# Patient Record
Sex: MALE | Race: BLACK OR AFRICAN AMERICAN | Employment: FULL TIME | URBAN - METROPOLITAN AREA
[De-identification: names, ages, dates, MRNs, and addresses within clinical notes are randomized per-mention and may not be internally consistent; named-entity substitution may affect disease eponyms.]

---

## 2020-03-04 PROCEDURE — 99283 EMERGENCY DEPT VISIT LOW MDM: CPT

## 2020-03-05 ENCOUNTER — HOSPITAL ENCOUNTER (EMERGENCY)
Age: 25
Discharge: HOME | End: 2020-03-05
Attending: EMERGENCY MEDICINE
Payer: COMMERCIAL

## 2020-03-05 ENCOUNTER — HOSPITAL ENCOUNTER (OUTPATIENT)
Dept: CT IMAGING | Age: 25
Discharge: HOME | End: 2020-03-05
Attending: EMERGENCY MEDICINE

## 2020-03-05 VITALS
OXYGEN SATURATION: 100 % | SYSTOLIC BLOOD PRESSURE: 124 MMHG | WEIGHT: 129 LBS | HEIGHT: 66 IN | BODY MASS INDEX: 20.73 KG/M2 | RESPIRATION RATE: 16 BRPM | TEMPERATURE: 98 F | DIASTOLIC BLOOD PRESSURE: 78 MMHG | HEART RATE: 84 BPM

## 2020-03-05 DIAGNOSIS — R31.0 GROSS HEMATURIA: Primary | ICD-10-CM

## 2020-03-05 LAB
APPEARANCE UR: ABNORMAL
BACTERIA URNS QL MICRO: 0 /HPF
BILIRUB UR QL: NEGATIVE
CASTS URNS QL MICRO: ABNORMAL /LPF
COLOR UR: YELLOW
EPI CELLS #/AREA URNS HPF: ABNORMAL /HPF
GLUCOSE UR STRIP.AUTO-MCNC: NEGATIVE MG/DL
HGB UR QL STRIP: ABNORMAL
KETONES UR QL STRIP.AUTO: NEGATIVE MG/DL
LEUKOCYTE ESTERASE UR QL STRIP.AUTO: ABNORMAL
NITRITE UR QL STRIP.AUTO: NEGATIVE
PH UR STRIP: 6.5 [PH] (ref 5–9)
PROT UR STRIP-MCNC: ABNORMAL MG/DL
RBC #/AREA URNS HPF: >100 /HPF
SP GR UR REFRACTOMETRY: 1.01 (ref 1–1.02)
UROBILINOGEN UR QL STRIP.AUTO: 1 EU/DL (ref 0.2–1)
WBC URNS QL MICRO: ABNORMAL /HPF

## 2020-03-05 PROCEDURE — 74176 CT ABD & PELVIS W/O CONTRAST: CPT

## 2020-03-05 PROCEDURE — 81001 URINALYSIS AUTO W/SCOPE: CPT

## 2020-03-05 NOTE — ED PROVIDER NOTES
26-year-old male presents with 3-day history of gross hematuria. No falls, trauma, exercise/running/marching. Patient has no abdominal pain no testicular pain. Really no flank pain. There is no dysuria but he states it does \"feel weird\", patient passes clots sometimes and occasionally has to strain to empty his bladder but has not had any defacto bladder outlet obstruction/urinary retention. Past Medical History:   Diagnosis Date    Asthma        Past Surgical History:   Procedure Laterality Date    ABDOMEN SURGERY PROC UNLISTED      umbilical hernia         No family history on file.     Social History     Socioeconomic History    Marital status: SINGLE     Spouse name: Not on file    Number of children: Not on file    Years of education: Not on file    Highest education level: Not on file   Occupational History    Not on file   Social Needs    Financial resource strain: Not on file    Food insecurity:     Worry: Not on file     Inability: Not on file    Transportation needs:     Medical: Not on file     Non-medical: Not on file   Tobacco Use    Smoking status: Never Smoker    Smokeless tobacco: Never Used   Substance and Sexual Activity    Alcohol use: No    Drug use: No    Sexual activity: Not Currently   Lifestyle    Physical activity:     Days per week: Not on file     Minutes per session: Not on file    Stress: Not on file   Relationships    Social connections:     Talks on phone: Not on file     Gets together: Not on file     Attends Mosque service: Not on file     Active member of club or organization: Not on file     Attends meetings of clubs or organizations: Not on file     Relationship status: Not on file    Intimate partner violence:     Fear of current or ex partner: Not on file     Emotionally abused: Not on file     Physically abused: Not on file     Forced sexual activity: Not on file   Other Topics Concern    Not on file   Social History Narrative    Not on file ALLERGIES: Latex    Review of Systems   Constitutional: Negative for chills and fever. HENT: Negative for rhinorrhea and sore throat. Eyes: Negative for discharge and redness. Respiratory: Negative for cough and shortness of breath. Cardiovascular: Negative for chest pain and palpitations. Gastrointestinal: Negative for abdominal pain, diarrhea, nausea and vomiting. Genitourinary: Positive for difficulty urinating and hematuria. Negative for decreased urine volume, discharge, dysuria, flank pain, scrotal swelling, testicular pain and urgency. Musculoskeletal: Negative for arthralgias and back pain. Skin: Negative for rash. Neurological: Negative for dizziness and headaches. Vitals:    03/04/20 2352 03/05/20 0156   BP: 121/77 124/78   Pulse: 78 84   Resp: 16 16   Temp: 97.9 °F (36.6 °C) 98 °F (36.7 °C)   SpO2: 100% 100%   Weight: 58.5 kg (129 lb)    Height: 5' 6\" (1.676 m)             Physical Exam  Vitals signs and nursing note reviewed. Constitutional:       General: He is not in acute distress. Appearance: Normal appearance. He is well-developed. He is not ill-appearing, toxic-appearing or diaphoretic. HENT:      Head: Normocephalic and atraumatic. Eyes:      General:         Right eye: No discharge. Left eye: No discharge. Conjunctiva/sclera: Conjunctivae normal.   Neck:      Musculoskeletal: Normal range of motion and neck supple. Pulmonary:      Effort: Pulmonary effort is normal. No respiratory distress. Abdominal:      General: Abdomen is flat. Tenderness: There is no abdominal tenderness. There is no right CVA tenderness, left CVA tenderness, guarding or rebound. Musculoskeletal: Normal range of motion. Skin:     General: Skin is warm and dry. Findings: No rash. Neurological:      General: No focal deficit present. Mental Status: He is alert and oriented to person, place, and time. Mental status is at baseline.       Motor: No abnormal muscle tone. Comments: cni 2-12 grossly  Nl gait,  Nl speech     Psychiatric:         Mood and Affect: Mood normal.         Behavior: Behavior normal.          MDM  Number of Diagnoses or Management Options  Gross hematuria:   Diagnosis management comments: Medical decision making note:  Gross hematuria. Will culture urine, refrain from antibiotics at this point. CT urogram is negative,  Will refer to urology for close follow-up, indications to return discussed, to include fever, urinary outlet obstruction. This concludes the \"medical decision making note\" part of this emergency department visit note.            Procedures

## 2020-03-05 NOTE — ED TRIAGE NOTES
Patient arrives to ED from home. Patient complains of blood is in urine. Patient states this started a few days ago. Patient states it's a lot of blood and it's bright red. Denies any trauma. Patient denies pain and states, \"it just feels weird\". Patient states it's a lot of clots. Spoke with Rick Atkins MD and orders received.

## 2020-03-05 NOTE — DISCHARGE INSTRUCTIONS
Drink plenty of fluids  Return to the ER if you are unable to urinate  Follow-up with with Dr. Skylar Ellison, call him tomorrow. Patient Education        Blood in the Urine: Care Instructions  Your Care Instructions    Blood in the urine, or hematuria, may make the urine look red, brown, or pink. There may be blood every time you urinate or just from time to time. You cannot always see blood in the urine, but it will show up in a urine test.  Blood in the urine may be serious. It should always be checked by a doctor. Your doctor may recommend more tests, including an X-ray, a CT scan, or a cystoscopy (which lets a doctor look inside the urethra and bladder). Blood in the urine can be a sign of another problem. Common causes are bladder infections and kidney stones. An injury to your groin or your genital area can also cause bleeding in the urinary tract. Very hard exercise--such as running a marathon--can cause blood in the urine. Blood in the urine can also be a sign of kidney disease or cancer in the bladder or kidney. Many cases of blood in the urine are caused by a harmless condition that runs in families. This is called benign familial hematuria. It does not need any treatment. Sometimes your urine may look red or brown even though it does not contain blood. For example, not getting enough fluids (dehydration), taking certain medicines, or having a liver problem can change the color of your urine. Eating foods such as beets, rhubarb, or blackberries or foods with red food coloring can make your urine look red or pink. Follow-up care is a key part of your treatment and safety. Be sure to make and go to all appointments, and call your doctor if you are having problems. It's also a good idea to know your test results and keep a list of the medicines you take. When should you call for help? Call your doctor now or seek immediate medical care if:    · You have symptoms of a urinary infection. For example:  ?  You have pus in your urine. ? You have pain in your back just below your rib cage. This is called flank pain. ? You have a fever, chills, or body aches. ? It hurts to urinate. ? You have groin or belly pain.     · You have more blood in your urine.    Watch closely for changes in your health, and be sure to contact your doctor if:    · You have new urination problems.     · You do not get better as expected. Where can you learn more? Go to http://nam-vijaya.info/. Enter V389 in the search box to learn more about \"Blood in the Urine: Care Instructions. \"  Current as of: December 19, 2018  Content Version: 12.2  © 3155-3573 [employer]. Care instructions adapted under license by [employer] (which disclaims liability or warranty for this information). If you have questions about a medical condition or this instruction, always ask your healthcare professional. [place] [address] any warranty or liability for your use of this information.

## 2021-03-09 ENCOUNTER — HOSPITAL ENCOUNTER (EMERGENCY)
Age: 26
Discharge: HOME | End: 2021-03-09
Attending: STUDENT IN AN ORGANIZED HEALTH CARE EDUCATION/TRAINING PROGRAM

## 2021-03-09 VITALS
SYSTOLIC BLOOD PRESSURE: 124 MMHG | OXYGEN SATURATION: 97 % | DIASTOLIC BLOOD PRESSURE: 71 MMHG | RESPIRATION RATE: 18 BRPM | WEIGHT: 130 LBS | BODY MASS INDEX: 20.89 KG/M2 | HEART RATE: 83 BPM | HEIGHT: 66 IN | TEMPERATURE: 98.2 F

## 2021-03-09 DIAGNOSIS — B00.1 HERPES LABIALIS: Primary | ICD-10-CM

## 2021-03-09 PROCEDURE — 99282 EMERGENCY DEPT VISIT SF MDM: CPT

## 2021-03-09 RX ORDER — ACYCLOVIR 800 MG/1
400 TABLET ORAL
Qty: 35 TAB | Refills: 4 | Status: SHIPPED | OUTPATIENT
Start: 2021-03-09 | End: 2021-03-09 | Stop reason: SDUPTHER

## 2021-03-09 RX ORDER — ACYCLOVIR 800 MG/1
400 TABLET ORAL
Qty: 35 TAB | Refills: 4 | Status: SHIPPED | OUTPATIENT
Start: 2021-03-09 | End: 2021-03-16

## 2021-03-10 ENCOUNTER — HOSPITAL ENCOUNTER (EMERGENCY)
Age: 26
Discharge: HOME | End: 2021-03-10
Attending: EMERGENCY MEDICINE

## 2021-03-10 VITALS
DIASTOLIC BLOOD PRESSURE: 95 MMHG | HEART RATE: 81 BPM | HEIGHT: 66 IN | RESPIRATION RATE: 16 BRPM | OXYGEN SATURATION: 98 % | SYSTOLIC BLOOD PRESSURE: 124 MMHG | TEMPERATURE: 98 F | BODY MASS INDEX: 20.89 KG/M2 | WEIGHT: 130 LBS

## 2021-03-10 DIAGNOSIS — B00.1 COLD SORE: Primary | ICD-10-CM

## 2021-03-10 PROCEDURE — 99282 EMERGENCY DEPT VISIT SF MDM: CPT

## 2021-03-10 NOTE — ED NOTES
I have reviewed discharge instructions with the patient. The patient verbalized understanding. Patient left ED via Discharge Method: ambulatory to Home with \"baby mama\". Opportunity for questions and clarification provided. Patient given 1 scripts. To continue your aftercare when you leave the hospital, you may receive an automated call from our care team to check in on how you are doing. This is a free service and part of our promise to provide the best care and service to meet your aftercare needs.  If you have questions, or wish to unsubscribe from this service please call [phone]. Thank you for Choosing our [facility] Emergency Department.

## 2021-03-10 NOTE — DISCHARGE INSTRUCTIONS
Take the medication prescribed as directed. Arrange follow-up with one of the primary care providers listed. Return for worsening symptoms, concerns or questions.

## 2021-03-10 NOTE — ED TRIAGE NOTES
Arrives with face mask in place. Reports pain and swelling to right side upper lip. Onset earlier today. Hx HSV.  Reports as similar pain

## 2021-03-10 NOTE — Clinical Note
[facility] EMERGENCY DEPT 
ONE  [address] [facility] 
[phone] Work/School Note Date: 3/10/2021 To Whom It May concern: 
 
Dario Rodriguez was seen and treated today in the emergency room by the following provider(s): 
Attending Provider: Anali Henry MD.   
 
Dario Rodriguez is excused from work/school on 03/10/21 and 03/11/21. He is medically clear to return to work/school on 3/12/2021.   
 
 
Sincerely, 
 
 
 
 
Sakshi Robbins MD

## 2021-03-10 NOTE — ED PROVIDER NOTES
50-year-old male patient with a history of recurrent cold sores presents to the emergency department with reports of swelling over the right upper lip that started today. Patient reports symptoms consistent with cold sore. He denies ulcerations elsewhere on his body. He denies any associated fever or chills and reports no difficulty swallowing. Past Medical History:   Diagnosis Date    Asthma        Past Surgical History:   Procedure Laterality Date    ABDOMEN SURGERY PROC UNLISTED      umbilical hernia         No family history on file.     Social History     Socioeconomic History    Marital status: SINGLE     Spouse name: Not on file    Number of children: Not on file    Years of education: Not on file    Highest education level: Not on file   Occupational History    Not on file   Social Needs    Financial resource strain: Not on file    Food insecurity     Worry: Not on file     Inability: Not on file    Transportation needs     Medical: Not on file     Non-medical: Not on file   Tobacco Use    Smoking status: Never Smoker    Smokeless tobacco: Never Used   Substance and Sexual Activity    Alcohol use: No    Drug use: No    Sexual activity: Not Currently   Lifestyle    Physical activity     Days per week: Not on file     Minutes per session: Not on file    Stress: Not on file   Relationships    Social connections     Talks on phone: Not on file     Gets together: Not on file     Attends Lutheran service: Not on file     Active member of club or organization: Not on file     Attends meetings of clubs or organizations: Not on file     Relationship status: Not on file    Intimate partner violence     Fear of current or ex partner: Not on file     Emotionally abused: Not on file     Physically abused: Not on file     Forced sexual activity: Not on file   Other Topics Concern    Not on file   Social History Narrative    Not on file         ALLERGIES: Latex    Review of Systems   All other systems reviewed and are negative. Vitals:    03/09/21 2000   BP: 124/71   Pulse: 83   Resp: 18   Temp: 98.2 °F (36.8 °C)   SpO2: 97%   Weight: 59 kg (130 lb)   Height: 5' 6\" (1.676 m)            Physical Exam  Vitals signs and nursing note reviewed. Constitutional:       Appearance: Normal appearance. HENT:      Right Ear: Tympanic membrane normal.      Left Ear: Tympanic membrane normal.      Nose: Nose normal.      Mouth/Throat:      Mouth: Mucous membranes are moist.      Comments: There is a single ulcerated appearing area over the lateral aspect of the right upper lip. This is isolated to the exterior surface of the lip. Intraoral exam is unremarkable. Eyes:      Extraocular Movements: Extraocular movements intact. Neck:      Musculoskeletal: Normal range of motion. Pulmonary:      Effort: Pulmonary effort is normal.   Abdominal:      General: Abdomen is flat. Musculoskeletal: Normal range of motion. Skin:     General: Skin is warm. Capillary Refill: Capillary refill takes less than 2 seconds. Neurological:      General: No focal deficit present. Mental Status: He is alert. MDM  Number of Diagnoses or Management Options  Herpes labialis: new and does not require workup  Diagnosis management comments: Symptoms consistent with herpes labialis. History of same, patient will be treated accordingly. Voice dictation software was used during the making of this note. This software is not perfect and grammatical and other typographical errors may be present. This note has been proofread, but may still contain errors.   [address] Doctor Thomas Fletcher [facility]; 3/9/2021 @11:56 PM   ===================================================================      Risk of Complications, Morbidity, and/or Mortality  Presenting problems: low  Diagnostic procedures: low  Management options: low    Patient Progress  Patient progress: stable         Procedures

## 2021-03-11 NOTE — ED NOTES
I have reviewed discharge instructions with the patient and significant other. The patient and significant other verbalized understanding. Patient left ED via Discharge Method: ambulatory to Home with significant other    Opportunity for questions and clarification provided. Patient given 0 scripts. To continue your aftercare when you leave the hospital, you may receive an automated call from our care team to check in on how you are doing. This is a free service and part of our promise to provide the best care and service to meet your aftercare needs.  If you have questions, or wish to unsubscribe from this service please call [phone]. Thank you for Choosing our [address] [place] [address] Emergency Department.

## 2021-03-11 NOTE — ED PROVIDER NOTES
Seen here last night for cold sore, was prescribed acyclovir which she is taking, but needs a work note for couple days. No other complaints. Past Medical History:   Diagnosis Date    Asthma        Past Surgical History:   Procedure Laterality Date    ABDOMEN SURGERY PROC UNLISTED      umbilical hernia         No family history on file. Social History     Socioeconomic History    Marital status: SINGLE     Spouse name: Not on file    Number of children: Not on file    Years of education: Not on file    Highest education level: Not on file   Occupational History    Not on file   Social Needs    Financial resource strain: Not on file    Food insecurity     Worry: Not on file     Inability: Not on file    Transportation needs     Medical: Not on file     Non-medical: Not on file   Tobacco Use    Smoking status: Never Smoker    Smokeless tobacco: Never Used   Substance and Sexual Activity    Alcohol use: No    Drug use: No    Sexual activity: Not Currently   Lifestyle    Physical activity     Days per week: Not on file     Minutes per session: Not on file    Stress: Not on file   Relationships    Social connections     Talks on phone: Not on file     Gets together: Not on file     Attends Church service: Not on file     Active member of club or organization: Not on file     Attends meetings of clubs or organizations: Not on file     Relationship status: Not on file    Intimate partner violence     Fear of current or ex partner: Not on file     Emotionally abused: Not on file     Physically abused: Not on file     Forced sexual activity: Not on file   Other Topics Concern    Not on file   Social History Narrative    Not on file         ALLERGIES: Latex    Review of Systems   Skin: Positive for rash.        Vitals:    03/10/21 2257   BP: (!) 124/95   Pulse: 81   Resp: 16   Temp: 98 °F (36.7 °C)   SpO2: 98%   Weight: 59 kg (130 lb)   Height: 5' 6\" (1.676 m)            Physical Exam  Vitals signs and nursing note reviewed. Constitutional:       General: He is not in acute distress. Appearance: Normal appearance. He is well-developed. He is not ill-appearing, toxic-appearing or diaphoretic. HENT:      Head: Normocephalic and atraumatic. Right Ear: External ear normal.      Left Ear: External ear normal.   Eyes:      General:         Right eye: No discharge. Left eye: No discharge. Conjunctiva/sclera: Conjunctivae normal.   Neck:      Musculoskeletal: Normal range of motion and neck supple. Pulmonary:      Effort: Pulmonary effort is normal. No respiratory distress. Musculoskeletal: Normal range of motion. Skin:     General: Skin is warm and dry. Findings: No rash. Neurological:      General: No focal deficit present. Mental Status: He is alert and oriented to person, place, and time. Mental status is at baseline. Motor: No abnormal muscle tone.       Comments: cni 2-12 grossly  Nl gait,  Nl speech     Psychiatric:         Mood and Affect: Mood normal.         Behavior: Behavior normal.          MDM  Number of Diagnoses or Management Options  Cold sore: minor  Diagnosis management comments: Acute work note deficiency, now remedied       Amount and/or Complexity of Data Reviewed  Decide to obtain previous medical records or to obtain history from someone other than the patient: yes    Patient Progress  Patient progress: improved         Procedures

## 2021-03-11 NOTE — ED TRIAGE NOTES
Patient arrives ambulatory to triage with mask in place. Patient reports he has a cold sore on top lip. Here last night and received acyclovir. Requesting work note for today and tomorrow and did not receive one yesterday.

## 2022-02-13 ENCOUNTER — HOSPITAL ENCOUNTER (EMERGENCY)
Age: 27
Discharge: HOME | End: 2022-02-14
Attending: EMERGENCY MEDICINE

## 2022-02-13 VITALS
TEMPERATURE: 98.1 F | HEIGHT: 66 IN | WEIGHT: 130 LBS | HEART RATE: 65 BPM | DIASTOLIC BLOOD PRESSURE: 65 MMHG | SYSTOLIC BLOOD PRESSURE: 118 MMHG | BODY MASS INDEX: 20.89 KG/M2 | OXYGEN SATURATION: 100 % | RESPIRATION RATE: 18 BRPM

## 2022-02-13 DIAGNOSIS — B00.53 HSV BLEPHAROCONJUNCTIVITIS: Primary | ICD-10-CM

## 2022-02-13 DIAGNOSIS — B00.59 HSV BLEPHAROCONJUNCTIVITIS: Primary | ICD-10-CM

## 2022-02-13 PROCEDURE — 99283 EMERGENCY DEPT VISIT LOW MDM: CPT

## 2022-02-13 RX ORDER — TETRACAINE HYDROCHLORIDE 5 MG/ML
1 SOLUTION OPHTHALMIC
Status: DISCONTINUED | OUTPATIENT
Start: 2022-02-13 | End: 2022-02-14 | Stop reason: HOSPADM

## 2022-02-13 NOTE — Clinical Note
[address] [facility] EMERGENCY DEPT   [facility] [id number] [phone]    Work/School Note    Date: 2/13/2022    To Whom It May concern:    Mel Bonilla was seen and treated today in the emergency room by the following provider(s):  Attending Provider: Patrick Marcus MD  Physician Assistant: Reshma Pulido. Mel Bonilla is excused from work/school on 02/14/22 and 02/15/22. He is medically clear to return to work/school on 2/16/2022.        Sincerely,          Reshma Dallas

## 2022-02-14 PROBLEM — B00.59: Status: ACTIVE | Noted: 2022-02-14

## 2022-02-14 PROBLEM — B00.53: Status: ACTIVE | Noted: 2022-02-14

## 2022-02-14 PROCEDURE — 74011250637 HC RX REV CODE- 250/637: Performed by: PHYSICIAN ASSISTANT

## 2022-02-14 RX ORDER — VALACYCLOVIR HYDROCHLORIDE 1 G/1
1000 TABLET, FILM COATED ORAL 3 TIMES DAILY
Qty: 21 TABLET | Refills: 0 | Status: SHIPPED | OUTPATIENT
Start: 2022-02-14 | End: 2022-02-21

## 2022-02-14 RX ORDER — VALACYCLOVIR HYDROCHLORIDE 500 MG/1
1000 TABLET, FILM COATED ORAL ONCE
Status: COMPLETED | OUTPATIENT
Start: 2022-02-14 | End: 2022-02-14

## 2022-02-14 RX ADMIN — VALACYCLOVIR HYDROCHLORIDE 1000 MG: 500 TABLET, FILM COATED ORAL at 00:14

## 2022-02-14 NOTE — ED PROVIDER NOTES
Patient comes in with right eye swelling/pain for a few days. He says the swelling worsened around 8pm. Denies vision changes but states the eye is swollen shut. He says he has had herpes of the eyelid in the past and this feels similar. Denies fevers or pain with eye movements. Past Medical History:   Diagnosis Date    Asthma        Past Surgical History:   Procedure Laterality Date    ABDOMEN SURGERY PROC UNLISTED      umbilical hernia         No family history on file. Social History     Socioeconomic History    Marital status: SINGLE     Spouse name: Not on file    Number of children: Not on file    Years of education: Not on file    Highest education level: Not on file   Occupational History    Not on file   Tobacco Use    Smoking status: Never Smoker    Smokeless tobacco: Never Used   Substance and Sexual Activity    Alcohol use: No    Drug use: No    Sexual activity: Not Currently   Other Topics Concern    Not on file   Social History Narrative    Not on file     Social Determinants of Health     Financial Resource Strain:     Difficulty of Paying Living Expenses: Not on file   Food Insecurity:     Worried About Running Out of Food in the Last Year: Not on file    Mildred of Food in the Last Year: Not on file   Transportation Needs:     Lack of Transportation (Medical): Not on file    Lack of Transportation (Non-Medical):  Not on file   Physical Activity:     Days of Exercise per Week: Not on file    Minutes of Exercise per Session: Not on file   Stress:     Feeling of Stress : Not on file   Social Connections:     Frequency of Communication with Friends and Family: Not on file    Frequency of Social Gatherings with Friends and Family: Not on file    Attends Oriental orthodox Services: Not on file    Active Member of Clubs or Organizations: Not on file    Attends Club or Organization Meetings: Not on file    Marital Status: Not on file   Intimate Partner Violence:     Fear of Current or Ex-Partner: Not on file    Emotionally Abused: Not on file    Physically Abused: Not on file    Sexually Abused: Not on file   Housing Stability:     Unable to Pay for Housing in the Last Year: Not on file    Number of Places Lived in the Last Year: Not on file    Unstable Housing in the Last Year: Not on file         ALLERGIES: Latex    Review of Systems   Constitutional: Negative for activity change, chills and fever. HENT: Negative for congestion. Eyes: Positive for discharge and redness. Negative for photophobia, pain, itching and visual disturbance. Respiratory: Negative for cough and shortness of breath. Cardiovascular: Negative for chest pain. Gastrointestinal: Negative for abdominal pain and vomiting. Skin: Negative for rash. Neurological: Negative for speech difficulty and numbness. All other systems reviewed and are negative. Vitals:    02/13/22 2252   BP: 118/65   Pulse: 65   Resp: 18   Temp: 98.1 °F (36.7 °C)   SpO2: 100%   Weight: 59 kg (130 lb)   Height: 5' 6\" (1.676 m)            Physical Exam  Vitals and nursing note reviewed. Constitutional:       Appearance: Normal appearance. HENT:      Head: Normocephalic and atraumatic. Right Ear: Tympanic membrane, ear canal and external ear normal.      Left Ear: Tympanic membrane, ear canal and external ear normal.      Nose: Nose normal.      Mouth/Throat:      Mouth: Mucous membranes are moist.   Eyes:      General: No scleral icterus. Right eye: Discharge present. No foreign body. Left eye: No discharge. Extraocular Movements: Extraocular movements intact. Right eye: Normal extraocular motion and no nystagmus. Left eye: Normal extraocular motion and no nystagmus. Conjunctiva/sclera: Conjunctivae normal.      Pupils: Pupils are equal, round, and reactive to light. Right eye: Pupil is reactive and not sluggish. Fluorescein uptake present. No corneal abrasion.       Left eye: Pupil is reactive and not sluggish. No corneal abrasion or fluorescein uptake. Comments: Right lid is swollen shut, patient has to manually pull the upper lid to open his eye. No photophobia. No pain with eye movements. Small amount of fluorescein uptake right eye, no obvious dendritic lesions noted. Cluster of vesicles on the right outer lower lid. See attached clinical media attached. Cardiovascular:      Rate and Rhythm: Normal rate and regular rhythm. Pulses: Normal pulses. Heart sounds: Normal heart sounds. No murmur heard. No friction rub. No gallop. Pulmonary:      Effort: Pulmonary effort is normal.      Breath sounds: Normal breath sounds. Skin:     General: Skin is warm and dry. Neurological:      Mental Status: He is alert. MDM  Number of Diagnoses or Management Options  HSV blepharoconjunctivitis: new and requires workup  Diagnosis management comments: Ophthalmologist was consulted, she recommended starting oral Valtrex and having him follow-up in the office outpatient tomorrow morning at 10:50 AM.  Patient verbalized understanding. ED Course as of 02/14/22 0012   Chuck Travis Feb 13, 2022   6241 Right eye 20/50 and left eye 20/20 [AR]   7511 Spoke to ophthamology about the patient. She recommends for us to give Valtrex oral treatment  Since he has only small amount of fluorescein uptake, will not start on any eye drops at this time. Dr. No Hunter  She says she made him an appointment for tomorrow morning, he will need to come in at 10:50 AM to the office.  [AR]      ED Course User Index  [AR] Kian Mo [place]       Procedures

## 2022-02-14 NOTE — DISCHARGE INSTRUCTIONS
Follow up tomorrow morning at 10:50 AM with the eye doctor, Dr. Rolande Collet, MD    Here is the address:  [address]  [phone]    Take the valtrex as prescribed  Return for emergent symptoms such as severe worsening pain or vision changes  Apply warm compresses to the area daily

## 2022-02-14 NOTE — ED TRIAGE NOTES
Pt walked in to triage with mask in place, Pt reports right eye swelling, unable to open eye around 8pm.

## 2022-02-14 NOTE — ED NOTES
I have reviewed discharge instructions with the patient. The patient verbalized understanding. Patient left ED via Discharge Method: ambulatory to Home with self. Opportunity for questions and clarification provided. Patient given 0 scripts. To continue your aftercare when you leave the hospital, you may receive an automated call from our care team to check in on how you are doing. This is a free service and part of our promise to provide the best care and service to meet your aftercare needs.  If you have questions, or wish to unsubscribe from this service please call [phone]. Thank you for Choosing our [facility] Emergency Department.

## 2022-03-19 PROBLEM — B00.59: Status: ACTIVE | Noted: 2022-02-14

## 2022-03-19 PROBLEM — B00.53: Status: ACTIVE | Noted: 2022-02-14

## 2022-11-15 ENCOUNTER — HOSPITAL ENCOUNTER (OUTPATIENT)
Dept: GENERAL RADIOLOGY | Age: 27
Discharge: HOME | End: 2022-11-18

## 2022-11-15 DIAGNOSIS — Z00.8 HEALTH EXAMINATION IN POPULATION SURVEYS: ICD-10-CM

## 2022-11-15 PROCEDURE — 71046 X-RAY EXAM CHEST 2 VIEWS: CPT

## 2023-09-29 ENCOUNTER — HOSPITAL ENCOUNTER (EMERGENCY)
Age: 28
Discharge: HOME | End: 2023-09-29
Attending: EMERGENCY MEDICINE

## 2023-09-29 VITALS
TEMPERATURE: 98 F | HEART RATE: 71 BPM | DIASTOLIC BLOOD PRESSURE: 70 MMHG | OXYGEN SATURATION: 100 % | SYSTOLIC BLOOD PRESSURE: 108 MMHG | RESPIRATION RATE: 14 BRPM

## 2023-09-29 DIAGNOSIS — Z77.29 CARBON MONOXIDE EXPOSURE: Primary | ICD-10-CM

## 2023-09-29 PROCEDURE — 96374 THER/PROPH/DIAG INJ IV PUSH: CPT

## 2023-09-29 PROCEDURE — 99284 EMERGENCY DEPT VISIT MOD MDM: CPT

## 2023-09-29 PROCEDURE — 6360000002 HC RX W HCPCS: Performed by: EMERGENCY MEDICINE

## 2023-09-29 RX ORDER — KETOROLAC TROMETHAMINE 15 MG/ML
15 INJECTION, SOLUTION INTRAMUSCULAR; INTRAVENOUS ONCE
Status: COMPLETED | OUTPATIENT
Start: 2023-09-29 | End: 2023-09-29

## 2023-09-29 RX ORDER — ONDANSETRON 4 MG/1
4 TABLET, ORALLY DISINTEGRATING ORAL EVERY 8 HOURS PRN
Status: DISCONTINUED | OUTPATIENT
Start: 2023-09-29 | End: 2023-09-29 | Stop reason: HOSPADM

## 2023-09-29 RX ADMIN — KETOROLAC TROMETHAMINE 15 MG: 15 INJECTION, SOLUTION INTRAMUSCULAR; INTRAVENOUS at 16:01

## 2023-09-29 ASSESSMENT — LIFESTYLE VARIABLES
HOW MANY STANDARD DRINKS CONTAINING ALCOHOL DO YOU HAVE ON A TYPICAL DAY: PATIENT DOES NOT DRINK
HOW OFTEN DO YOU HAVE A DRINK CONTAINING ALCOHOL: NEVER

## 2023-09-29 ASSESSMENT — ENCOUNTER SYMPTOMS
NAUSEA: 1
VOMITING: 0

## 2023-09-29 NOTE — DISCHARGE INSTRUCTIONS
Take Zofran as needed for nausea. Stop smoking. When you return to the apartment open all your windows and turn on fans to help air out the apartment.

## 2023-09-29 NOTE — ED TRIAGE NOTES
Pt arrives to ER with c/o concern for carbon monoxide exposure in home x 2 days. Pt states feeling some dizziness and nausea.

## 2023-09-29 NOTE — PROGRESS NOTES
Co-Oximetry completed.  Results as follows:     ctHb   14.4  sO2    57  FO2Hb   54.6  FCOHb   3.3  FMetHb   0.8  FHHb     41.2

## 2023-09-29 NOTE — PROGRESS NOTES
Co-Oximetry completed.  Results as follows:    ctHb   13.8  sO2    52.4  FO2Hb   49.4  FCOHb   4.9  FMetHb   0.8  FHHb     44.9